# Patient Record
Sex: MALE | Race: BLACK OR AFRICAN AMERICAN
[De-identification: names, ages, dates, MRNs, and addresses within clinical notes are randomized per-mention and may not be internally consistent; named-entity substitution may affect disease eponyms.]

---

## 2017-05-21 ENCOUNTER — HOSPITAL ENCOUNTER (EMERGENCY)
Dept: HOSPITAL 10 - FTE | Age: 52
Discharge: HOME | End: 2017-05-21
Payer: MEDICARE

## 2017-05-21 VITALS
BODY MASS INDEX: 23.29 KG/M2 | HEIGHT: 72 IN | WEIGHT: 171.96 LBS | WEIGHT: 171.96 LBS | BODY MASS INDEX: 23.29 KG/M2 | HEIGHT: 72 IN

## 2017-05-21 DIAGNOSIS — H10.9: Primary | ICD-10-CM

## 2017-05-21 DIAGNOSIS — F17.210: ICD-10-CM

## 2017-05-21 DIAGNOSIS — Z79.4: ICD-10-CM

## 2017-05-21 DIAGNOSIS — E11.9: ICD-10-CM

## 2017-05-21 PROCEDURE — 99283 EMERGENCY DEPT VISIT LOW MDM: CPT

## 2017-05-21 NOTE — ERD
ER Documentation


Chief Complaint


Date/Time


DATE: 17 


TIME: 12:14


Chief Complaint


left eye redness





HPI


52-year-old male presented emergency department for left eye redness for 2 days.





Denies headache, loss of consciousness, eye trauma, head trauma, dizziness, 

blurry vision, changes in vision, photophobia, facial pain, ear pain, throat 

pain, difficulty swallowing, neck pain, shoulder pain, chest pain, cough, 

hemoptysis, abdominal pain, back pain, loss of appetite, nausea, vomiting, 

hematochezia, diarrhea, constipation, urinary symptoms, bladder and bowel 

incontinences, extremity weakness, extremity tenderness, numbness or tingling 

sensation, difficulty walking, recent travel, recent exposure to illness, 

recent antibiotic use in the last 3 months, fever, chills. 





No known drug allergies.  


Past medical history of diabetes.  


Surgical history of right Achilles tendon repair.


Medication: Stated that he uses insulin for his diabetes.


Social history: Not working at this time.  Smokes 1 pack of cigarettes a day.  

Denies use of alcoholic beverages, use of illegal drugs.





ROS


All systems reviewed and are negative except as per history of present illness.





Medications


Home Meds


Active Scripts


Erythromycin* (Erythromycin* Ophthalmic) 1 Applic Oint, 1 APPLIC BOTH EYES QID 

for 7 Days, EA


   Prov:GORAN STOCKTON         17





PMhx/Soc


Medical and Surgical Hx:  pt denies Medical Hx, pt denies Surgical Hx


Hx Alcohol Use:  Yes


Hx Substance Use:  Yes


Hx Tobacco Use:  Yes


Smoking Status:  Current every day smoker





Physical Exam


Vitals





Physical Exam


CONSTITUTIONAL: Well-appearing; well-nourished; in no apparent distress.  


HEAD: Normocephalic; atraumatic.  


EYES: 


Left eye: Conjunctival redness.  Clear, sclera non-icteric, EOM intact. PERRLA.


Right eye: Conjunctiva redness.  Clear, sclera non-icteric, EOM intact. PERRLA.


Ears: Hearing intact. EACs clear, TMs non-bulging, non-inflamed, translucent & 

mobile, ossicles normal appearance, No obstructions, no erythema, no discharges


Nose: No obstructions. No polyps. No external lesions. Mucosa non-inflamed. No 

external lesions, septum and turbinates normal. No rhinorrhea. No discharges. 

Frontal sinus is non-tender to palpation. Maxillary sinus is non-tender to 

palpation. 


MOUTH: Moist mucous membranes, no lesion, no obstructions, no vesicles, no 

thrush, patent airway


Throat: Uvula in midline. Right tonsil is +1 with no erythema, no exudate. Left 

tonsil is +1 with no erythema, no exudate. Tolerating secretions well. Good gag 

reflex. Patent airway.


Neck: Supple, without lesions, bruits, or adenopathy. No mass. Thyroid non-

enlarged and non-tender to palpation.


CHEST: Symmetrical chest. Respirations even and not labored. No retractions 

noted.


CARDIOVASCULAR: Normal S1, S2. RRR. No murmurs, gallops.


RESPIRATORY: Normal chest excursion with respiration; breath sounds clear and 

equal bilaterally; no wheezes, rhonchi, or rales.  Breathing even and 

unlabored. Speaking in clear, full, and complete sentences w/ ease. 


ABDOMEN: Normal bowel sounds normal. Soft, round, non-distended, non-guarding, 

no tenderness, no rebound, no organomegaly, no masses, no pulsating abdominal 

mass. No hernia. No peritoneal signs.


: No CVA tenderness. 


BACK: Symmetrical shoulder. Spine is midline without deformity, tenderness. No 

evidence of trauma or deformity.


PELVIS: Stable pelvis. No evidence of trauma or deformity.  


MUSCULOSKELETAL: Normal gait and station. No misalignment, asymmetry, 

crepitation, defects, tenderness, masses, effusions, decreased range of motion, 

instability, atrophy or abnormal strength or tone in the head, neck, spine, ribs

, pelvis or extremities. No calf tenderness. 


NEUROVASCULAR: Distal pulses are present. Pedal pulse are present, equal, and 

normal. Capillary refills are < 2 seconds.


NEUROLOGIC: Alert and oriented x4. Speaks full and clear sentences. Cranial 

Nerves II-XII normal. Sensation to pain, touch, and proprioception normal. 

Grossly unremarkable. No neurologic deficits. Romberg test is negative.


PSYCHOLOGICAL: The patients mood and manner are appropriate. No hallucinations

, delusions. Not SI. Not HI. Has the capacity to decide for self


SKIN: Normal for age and ethnicity; warm; dry; good turgor; no apparent lesions 

or exudates. No rashes, hives, discoloration. Intact.


Results 24 hrs





 Current Medications








 Medications


  (Trade)  Dose


 Ordered  Sig/Daisy


 Route


 PRN Reason  Start Time


 Stop Time Status Last Admin


Dose Admin


 


 Fluorescein Sodium


  (Fluor-I-Strip)  1 strip  ONCE  ONCE


 LEFT EYE


   17 12:30


 17 12:31 DC  


 


 


 Proparacaine HCl


  (Alcaine 0.5%)  1 drop  ONCE  ONCE


 LEFT EYE


   17 12:30


 17 12:31 DC  


 


 


 Pantoprazole


  (Protonix Iv)  40 mg  ONCE  ONCE


 IV


   17 13:00


 17 13:01 Cancel  


 











Procedures/MDM


Examination: Please see physical examination.





Disease process, medical treatment was explained to the patient and family 

member. They verbalized understanding and agreed with the diagnostic tests, 

medical treatment, and follow-up care.





Treatment: Proparacaine.  Fluorescein.


Bilateral eye exam: No signs of foreign bodies.  No signs of globe rupture.  

Extraocular movement is good.  No vesicular lesions.  No herpetic lesions.  No 

pain on eye movement.





Re-evaluation: Denies headache, dizziness, blurry vision, ear pain, throat pain

, neck pain, neck stiffness, shoulder pain, chest pain, abdominal pain, back 

pain, nausea.  No episode of emesis in the emergency department.  No pain on 

eye movement.  Extraocular movement of the eyes is within normal limits.  Good 

and full range of motion of neck and spine.  No neurological deficits.  No 

neurovascular deficits.





Consultation: None.





Differential diagnosis: Subconjunctival hemorrhage versus conjunctivitis





Medical decision makin-year-old male presented emergency department for 

left eye redness for 2 days. Denies headache, loss of consciousness, eye trauma

, head trauma, dizziness, blurry vision, changes in vision, photophobia, facial 

pain, ear pain, throat pain, difficulty swallowing, neck pain, shoulder pain, 

chest pain, cough, hemoptysis, abdominal pain, back pain, loss of appetite, 

nausea, vomiting, hematochezia, diarrhea, constipation, urinary symptoms, 

bladder and bowel incontinences, extremity weakness, extremity tenderness, 

numbness or tingling sensation, difficulty walking, recent travel, recent 

exposure to illness, recent antibiotic use in the last 3 months, fever, chills.

  Patient's complaint, my physical findings, diagnostic test results, my 

reevaluation are consistent my final diagnosis of conjunctivitis.





Medications prescribed are the following: Erythromycin ophthalmic ointment.





Patient and family member are made aware of the side effects and adverse 

reactions of the medications prescribed. Instructed on when to seek emergent 

and medical attention in case allergic/anaphylactic reactions or severe side 

effects and or adverse reactions to medications. Patient and family member 

verbalized understanding. 





Patient instructed


Instructed to follow-up with his PCP in 24-48 hours.  PCP to refer patient to 

ophthalmologist if symptoms get worse.


Instructed to Call 911 for chest pain, shortness of breath. Advised to come 

back here in ED as soon as possible for severity of symptoms which includes but 

not limited to: any new symptoms; shortness of breath/difficulty of breathing; 

cardiovascular changes; severe gastrointestinal symptoms; signs and symptoms of 

bleeding and or infection; signs of compartment syndrome/neurovascular changes; 

neurological changes/deficits. 


Patient and family member verbalized understanding. 





Upon discharge, patient is alert and oriented x 4, speaks full and clear 

sentences, denies pain, has no neurological deficits, has no neurovascular 

deficits, difficulty of breathing. Breathing even and unlabored. Lung sounds 

are clear to auscultation. Not in distress. Appears comfortable.  Ambulatory 

with steady gait. Appears satisfied with care provided here in ED.





Departure


Diagnosis:  


 Primary Impression:  


 Conjunctivitis


Condition:  Stable





Additional Instructions:  


atient instructed


Instructed to follow-up with his PCP in 24-48 hours.  PCP to refer patient to 

ophthalmologist if symptoms get worse.


Instructed to Call 911 for chest pain, shortness of breath. Advised to come 

back here in ED as soon as possible for severity of symptoms which includes but 

not limited to: any new symptoms; shortness of breath/difficulty of breathing; 

cardiovascular changes; severe gastrointestinal symptoms; signs and symptoms of 

bleeding and or infection; signs of compartment syndrome/neurovascular changes; 

neurological changes/deficits. 


Patient and family member verbalized understanding.











GORAN STOCKTON May 21, 2017 12:22


bleeding and or infection; signs of compartment syndrome/neurovascular changes; 

neurological changes/deficits. 


Patient and family member verbalized understanding.











GORAN STOCKTON May 21, 2017 12:22

## 2018-01-21 ENCOUNTER — HOSPITAL ENCOUNTER (EMERGENCY)
Dept: HOSPITAL 91 - E/R | Age: 53
Discharge: LEFT BEFORE DISCHARGE | End: 2018-01-21
Payer: MEDICARE

## 2018-01-21 ENCOUNTER — HOSPITAL ENCOUNTER (EMERGENCY)
Age: 53
Discharge: LEFT BEFORE DISCHARGE | End: 2018-01-21

## 2018-01-21 DIAGNOSIS — Z53.21: Primary | ICD-10-CM

## 2018-01-25 ENCOUNTER — HOSPITAL ENCOUNTER (EMERGENCY)
Dept: HOSPITAL 54 - ER | Age: 53
Discharge: HOME | End: 2018-01-25
Payer: MEDICARE

## 2018-01-25 VITALS — SYSTOLIC BLOOD PRESSURE: 136 MMHG | DIASTOLIC BLOOD PRESSURE: 77 MMHG

## 2018-01-25 VITALS — BODY MASS INDEX: 27.28 KG/M2 | HEIGHT: 68 IN | WEIGHT: 180 LBS

## 2018-01-25 DIAGNOSIS — Z79.4: ICD-10-CM

## 2018-01-25 DIAGNOSIS — F17.210: ICD-10-CM

## 2018-01-25 DIAGNOSIS — E11.9: ICD-10-CM

## 2018-01-25 DIAGNOSIS — R06.02: Primary | ICD-10-CM

## 2018-01-25 LAB
ALBUMIN SERPL BCP-MCNC: 3.5 G/DL (ref 3.4–5)
ALP SERPL-CCNC: 132 U/L (ref 46–116)
ALT SERPL W P-5'-P-CCNC: 37 U/L (ref 12–78)
APTT PPP: 25 SEC (ref 23–34)
AST SERPL W P-5'-P-CCNC: 42 U/L (ref 15–37)
BASOPHILS # BLD AUTO: 0.2 /CMM (ref 0–0.2)
BASOPHILS NFR BLD AUTO: 3.3 % (ref 0–2)
BILIRUB DIRECT SERPL-MCNC: 0.1 MG/DL (ref 0–0.2)
BILIRUB SERPL-MCNC: 0.5 MG/DL (ref 0.2–1)
BUN SERPL-MCNC: 19 MG/DL (ref 7–18)
CALCIUM SERPL-MCNC: 9 MG/DL (ref 8.5–10.1)
CHLORIDE SERPL-SCNC: 104 MMOL/L (ref 98–107)
CO2 SERPL-SCNC: 29 MMOL/L (ref 21–32)
CREAT SERPL-MCNC: 0.9 MG/DL (ref 0.6–1.3)
EOSINOPHIL # BLD AUTO: 0.6 /CMM (ref 0–0.7)
EOSINOPHIL NFR BLD AUTO: 7.7 % (ref 0–6)
GLUCOSE SERPL-MCNC: 175 MG/DL (ref 74–106)
HCT VFR BLD AUTO: 40 % (ref 39–51)
HGB BLD-MCNC: 13.6 G/DL (ref 13.5–17.5)
INR PPP: 0.94 (ref 0.85–1.15)
LYMPHOCYTES NFR BLD AUTO: 1.6 /CMM (ref 0.8–4.8)
LYMPHOCYTES NFR BLD AUTO: 21.3 % (ref 20–44)
MCH RBC QN AUTO: 29 PG (ref 26–33)
MCHC RBC AUTO-ENTMCNC: 34 G/DL (ref 31–36)
MCV RBC AUTO: 84 FL (ref 80–96)
MONOCYTES NFR BLD AUTO: 0.6 /CMM (ref 0.1–1.3)
MONOCYTES NFR BLD AUTO: 8.6 % (ref 2–12)
NEUTROPHILS # BLD AUTO: 4.5 /CMM (ref 1.8–8.9)
NEUTROPHILS NFR BLD AUTO: 59.1 % (ref 43–81)
NT-PROBNP SERPL-MCNC: 70 PG/ML (ref 0–125)
PLATELET # BLD AUTO: 166 /CMM (ref 150–450)
POTASSIUM SERPL-SCNC: 3.7 MMOL/L (ref 3.5–5.1)
PROT SERPL-MCNC: 7.3 G/DL (ref 6.4–8.2)
RBC # BLD AUTO: 4.8 MIL/UL (ref 4.5–6)
RDW COEFFICIENT OF VARIATION: 12.5 (ref 11.5–15)
SODIUM SERPL-SCNC: 138 MMOL/L (ref 136–145)
TROPONIN I SERPL-MCNC: < 0.017 NG/ML (ref 0–0.06)
WBC NRBC COR # BLD AUTO: 7.5 K/UL (ref 4.3–11)

## 2018-01-25 PROCEDURE — Z7610: HCPCS

## 2018-01-25 PROCEDURE — A4606 OXYGEN PROBE USED W OXIMETER: HCPCS

## 2018-03-20 ENCOUNTER — HOSPITAL ENCOUNTER (INPATIENT)
Dept: HOSPITAL 54 - ER | Age: 53
LOS: 1 days | Discharge: HOME | DRG: 66 | End: 2018-03-21
Attending: INTERNAL MEDICINE | Admitting: INTERNAL MEDICINE
Payer: MEDICARE

## 2018-03-20 VITALS — DIASTOLIC BLOOD PRESSURE: 8 MMHG | SYSTOLIC BLOOD PRESSURE: 143 MMHG

## 2018-03-20 VITALS — SYSTOLIC BLOOD PRESSURE: 144 MMHG | DIASTOLIC BLOOD PRESSURE: 77 MMHG

## 2018-03-20 VITALS — BODY MASS INDEX: 26.63 KG/M2 | WEIGHT: 186 LBS | HEIGHT: 70 IN

## 2018-03-20 VITALS — SYSTOLIC BLOOD PRESSURE: 143 MMHG | DIASTOLIC BLOOD PRESSURE: 74 MMHG

## 2018-03-20 VITALS — DIASTOLIC BLOOD PRESSURE: 76 MMHG | SYSTOLIC BLOOD PRESSURE: 143 MMHG

## 2018-03-20 VITALS — DIASTOLIC BLOOD PRESSURE: 87 MMHG | SYSTOLIC BLOOD PRESSURE: 147 MMHG

## 2018-03-20 DIAGNOSIS — F17.210: ICD-10-CM

## 2018-03-20 DIAGNOSIS — I34.0: ICD-10-CM

## 2018-03-20 DIAGNOSIS — F99: ICD-10-CM

## 2018-03-20 DIAGNOSIS — I10: ICD-10-CM

## 2018-03-20 DIAGNOSIS — E11.9: ICD-10-CM

## 2018-03-20 DIAGNOSIS — I63.9: Primary | ICD-10-CM

## 2018-03-20 DIAGNOSIS — Z91.14: ICD-10-CM

## 2018-03-20 DIAGNOSIS — Z79.4: ICD-10-CM

## 2018-03-20 DIAGNOSIS — J45.909: ICD-10-CM

## 2018-03-20 DIAGNOSIS — E78.5: ICD-10-CM

## 2018-03-20 LAB
ALBUMIN SERPL BCP-MCNC: 3.3 G/DL (ref 3.4–5)
ALP SERPL-CCNC: 120 U/L (ref 46–116)
ALT SERPL W P-5'-P-CCNC: 27 U/L (ref 12–78)
APTT PPP: 28 SEC (ref 23–34)
AST SERPL W P-5'-P-CCNC: 27 U/L (ref 15–37)
BASOPHILS # BLD AUTO: 0.1 /CMM (ref 0–0.2)
BASOPHILS NFR BLD AUTO: 0.9 % (ref 0–2)
BILIRUB DIRECT SERPL-MCNC: 0.1 MG/DL (ref 0–0.2)
BILIRUB SERPL-MCNC: 0.5 MG/DL (ref 0.2–1)
BUN SERPL-MCNC: 18 MG/DL (ref 7–18)
CALCIUM SERPL-MCNC: 8.3 MG/DL (ref 8.5–10.1)
CHLORIDE SERPL-SCNC: 104 MMOL/L (ref 98–107)
CHOLEST SERPL-MCNC: 201 MG/DL (ref ?–200)
CO2 SERPL-SCNC: 25 MMOL/L (ref 21–32)
CREAT SERPL-MCNC: 0.9 MG/DL (ref 0.6–1.3)
EOSINOPHIL # BLD AUTO: 0.7 /CMM (ref 0–0.7)
EOSINOPHIL NFR BLD AUTO: 7.6 % (ref 0–6)
GLUCOSE SERPL-MCNC: 157 MG/DL (ref 74–106)
HCT VFR BLD AUTO: 36 % (ref 39–51)
HDLC SERPL-MCNC: 80 MG/DL (ref 40–60)
HGB BLD-MCNC: 12.2 G/DL (ref 13.5–17.5)
INR PPP: 0.95 (ref 0.87–1.13)
LDLC SERPL DIRECT ASSAY-MCNC: 101 MG/DL (ref 0–99)
LYMPHOCYTES NFR BLD AUTO: 1.4 /CMM (ref 0.8–4.8)
LYMPHOCYTES NFR BLD AUTO: 15.7 % (ref 20–44)
MCH RBC QN AUTO: 29 PG (ref 26–33)
MCHC RBC AUTO-ENTMCNC: 34 G/DL (ref 31–36)
MCV RBC AUTO: 85 FL (ref 80–96)
MONOCYTES NFR BLD AUTO: 0.8 /CMM (ref 0.1–1.3)
MONOCYTES NFR BLD AUTO: 9 % (ref 2–12)
NEUTROPHILS # BLD AUTO: 5.9 /CMM (ref 1.8–8.9)
NEUTROPHILS NFR BLD AUTO: 66.8 % (ref 43–81)
PLATELET # BLD AUTO: 176 /CMM (ref 150–450)
POTASSIUM SERPL-SCNC: 3.7 MMOL/L (ref 3.5–5.1)
PROT SERPL-MCNC: 6.7 G/DL (ref 6.4–8.2)
RBC # BLD AUTO: 4.25 MIL/UL (ref 4.5–6)
RDW COEFFICIENT OF VARIATION: 13 (ref 11.5–15)
SODIUM SERPL-SCNC: 139 MMOL/L (ref 136–145)
TRIGL SERPL-MCNC: 138 MG/DL (ref 30–150)
TROPONIN I SERPL-MCNC: < 0.017 NG/ML (ref 0–0.06)
WBC NRBC COR # BLD AUTO: 8.9 K/UL (ref 4.3–11)

## 2018-03-20 PROCEDURE — Z7610: HCPCS

## 2018-03-20 PROCEDURE — A4606 OXYGEN PROBE USED W OXIMETER: HCPCS

## 2018-03-20 RX ADMIN — METFORMIN HYDROCHLORIDE SCH MG: 850 TABLET, FILM COATED ORAL at 17:28

## 2018-03-20 NOTE — NUR
TELE RN ADMISSION NOTES

ADMITTED 51 YO MALE PT, ALERT, AWAKE, VERBALLY RESPONSIVE, ON ROOM AIR, RESPIRATIONS EVEN, 
UNLABORED, NO APPARENT DISTRESS NOTED. DENIES ANY PAIN OR DISCOMFORT AT THIS TIME, NO C/O OF 
NUMBNESS. IV SITE LT HAND INTACT, PATENT. BODY ASSESSMENT DONE, NO SKIN BREAKDOWN NOTED. 
CALL LIGHT WITHIN REACH, BED LOCKED IN LOWEST POSITION. KEPT CLEAN AND COMFORTABLE, ATTENDED 
ALL NEEDS. WILL CONTINUE TO MONITOR ACCORDINGLY.

## 2018-03-20 NOTE — NUR
TELE RN CLOSING NOTES

PT IN BED, RESTING COMFORTABLY, ON ROOM AIR, RESPIRATIONS EVEN, UNLABORED, NO APPARENT 
DISTRESS NOTED. IV SITE LT HAND INTACT, PATENT. DENIES ANY PAIN OR DISCOMFORT AT THIS TIME. 
NSR 78. CALL LIGHT WITHIN REACH, BED LOCKED IN LOWEST POSITION. ATTENDED ALL NEEDS. WILL 
CONTINUE TO MONITOR ACCORDINGLY.

## 2018-03-20 NOTE — NUR
MS RN OPENING NOTES

PT IN BED ALERT, AWAKE, VERBALLY RESPONSIVE, ON ROOM AIR RESPIRATIONS EVEN, UNLABORED, NO 
APPARENT DISTRESS NOTED.DENIES ANY PAIN OR DISCOMFORT AT THIS TIME. IV SITE LT HAND INTACT, 
PATENT. CALL LIGHT WITHIN REACH, ATTENDED ALL NEEDS. BED LOCKED IN LOWEST POSITION. WILL 
CONTINUE TO MONITOR ACCORDINGLY.

## 2018-03-20 NOTE — NUR
Patient is resting comfortably in bed with eyes closed. Easily aroused. VSS. NO 
S/S OF PT DISCOMFORT NOTED

## 2018-03-20 NOTE — NUR
BBRA WITH C/O "EPISODE OF NUMBNESS, WEAKNESS SINCE 2200 PTA. PT ALSO STATES "I 
CANT STOP SHAKING". UPON ARRIVAL PT DENIED ANY NUMBNESS, TINGLING, OR SLURRED 
SPEECH. PT IS AAOX4. SKIN WNL. PT ABLE TO MOVE ALL EXTREMITIES AND STATES EQUAL 
SENSATIONS BILATERALLY. RESP EVEN AND UNLABORED. NO S/S OF ACUTE DISTRESS 
NOTED. VSS. PT GOWNED AND PLACED ON MONITOR AND POX. PT SAFETY AND COMOFRT 
MEASURES IN PLACE. CALL LIGHT PLACED WITHIN REACH. AWAITING MD FOR EVAL

## 2018-03-21 VITALS — SYSTOLIC BLOOD PRESSURE: 139 MMHG | DIASTOLIC BLOOD PRESSURE: 77 MMHG

## 2018-03-21 VITALS — DIASTOLIC BLOOD PRESSURE: 77 MMHG | SYSTOLIC BLOOD PRESSURE: 139 MMHG

## 2018-03-21 LAB
BASOPHILS # BLD AUTO: 0 /CMM (ref 0–0.2)
BASOPHILS NFR BLD AUTO: 0.5 % (ref 0–2)
BUN SERPL-MCNC: 14 MG/DL (ref 7–18)
CALCIUM SERPL-MCNC: 8.4 MG/DL (ref 8.5–10.1)
CHLORIDE SERPL-SCNC: 103 MMOL/L (ref 98–107)
CHOLEST SERPL-MCNC: 214 MG/DL (ref ?–200)
CO2 SERPL-SCNC: 25 MMOL/L (ref 21–32)
CREAT SERPL-MCNC: 0.9 MG/DL (ref 0.6–1.3)
EOSINOPHIL # BLD AUTO: 0.7 /CMM (ref 0–0.7)
EOSINOPHIL NFR BLD AUTO: 7.1 % (ref 0–6)
GLUCOSE SERPL-MCNC: 129 MG/DL (ref 74–106)
HCT VFR BLD AUTO: 40 % (ref 39–51)
HDLC SERPL-MCNC: 78 MG/DL (ref 40–60)
HGB BLD-MCNC: 13.1 G/DL (ref 13.5–17.5)
LDLC SERPL DIRECT ASSAY-MCNC: 120 MG/DL (ref 0–99)
LYMPHOCYTES NFR BLD AUTO: 1.4 /CMM (ref 0.8–4.8)
LYMPHOCYTES NFR BLD AUTO: 14.9 % (ref 20–44)
MAGNESIUM SERPL-MCNC: 1.6 MG/DL (ref 1.8–2.4)
MCH RBC QN AUTO: 28 PG (ref 26–33)
MCHC RBC AUTO-ENTMCNC: 33 G/DL (ref 31–36)
MCV RBC AUTO: 86 FL (ref 80–96)
MONOCYTES NFR BLD AUTO: 0.8 /CMM (ref 0.1–1.3)
MONOCYTES NFR BLD AUTO: 8.3 % (ref 2–12)
NEUTROPHILS # BLD AUTO: 6.7 /CMM (ref 1.8–8.9)
NEUTROPHILS NFR BLD AUTO: 69.2 % (ref 43–81)
PHOSPHATE SERPL-MCNC: 3.2 MG/DL (ref 2.5–4.9)
PLATELET # BLD AUTO: 177 /CMM (ref 150–450)
POTASSIUM SERPL-SCNC: 4.3 MMOL/L (ref 3.5–5.1)
RBC # BLD AUTO: 4.63 MIL/UL (ref 4.5–6)
RDW COEFFICIENT OF VARIATION: 12.8 (ref 11.5–15)
SODIUM SERPL-SCNC: 137 MMOL/L (ref 136–145)
TRIGL SERPL-MCNC: 82 MG/DL (ref 30–150)
WBC NRBC COR # BLD AUTO: 9.6 K/UL (ref 4.3–11)

## 2018-03-21 RX ADMIN — METFORMIN HYDROCHLORIDE SCH MG: 850 TABLET, FILM COATED ORAL at 17:00

## 2018-03-21 RX ADMIN — MAGNESIUM SULFATE IN DEXTROSE SCH MLS/HR: 10 INJECTION, SOLUTION INTRAVENOUS at 10:56

## 2018-03-21 RX ADMIN — MAGNESIUM SULFATE IN DEXTROSE SCH MLS/HR: 10 INJECTION, SOLUTION INTRAVENOUS at 12:49

## 2018-03-21 RX ADMIN — METFORMIN HYDROCHLORIDE SCH MG: 850 TABLET, FILM COATED ORAL at 09:22

## 2018-03-21 NOTE — NUR
MS RN CLOSING NOTES

PT IN BED RESTING COMFORTABLY, ON ROOM AIR RESPIRATIONS EVEN, UNLABORED, NO APPARENT 
DISTRESS NOTED. DENIES ANY PAIN OR DISCOMFORT, NO NUMBNESS AT THIS TIME. IV SITE LT HAND 
INTACT, PATENT. CALL LIGHT WITHIN REACH. BED LOCKED IN LOWEST POSITION. WILL CONTINUE TO 
MONITOR ACCORDINGLY.

## 2018-03-21 NOTE — NUR
RN NOTES.

RECEIVED PATIENT IN BED , SLEEPING BUT EASILY [place], RESPIRATION EVEN AND UNLABORED, NO 
COMPLAINTS OF PAIN OR DISCOMFORT AT THIS TIME, IV LEFT HAND 20 GAUGE INTACT AND PATENT , NO 
REDNESS OR INFILTRATION NOTED, SAFETY MEASURES IN PLACE , CALL LIGHT KEPT WITHIN REACH. KEPT 
CLEAN, DRY AND COMFORTABLE, WILL CONTINUE TO MONITOR.

## 2018-03-21 NOTE — NUR
RN NOTES

PER MD PATIENT TO BE ON ASPIRIN AND LIPITOR NO ANTICOAGULANT AT THIS TIME.

PATIENT ON DVT PUMPS AND AMBULATORY.

VTE SCORE 1

-------------------------------------------------------------------------------

Addendum: 03/21/18 at 1146 by SHAMIKA TRIPATHI RN

-------------------------------------------------------------------------------

Amended: Links added.

## 2018-03-21 NOTE — NUR
consult was requested from NP Sloane Lopez in regards to homelessness. Pt 
is a 52 year old  male who was admitted to [facility] emergency department for 
left-sided face and hand numbness and with a significant motor deficit. Patient currently 
resides in his car near [facility] ([address] [place] [address]). Patient states 
that when he gets discharged the  at [facility] will be setting him up with 
Transitional Living. Patient has no emergency contact. Pt denies any drug or alcohol use. Pt 
reports that he stopped smoking cigarettes since he has been in the hospital. Prior to his 
admission he would smoke two packs a day. Pt. reports that he has been diagnosed with 
schizophrenia. Pt reports he sees a psychiatrist and receives medication at [facility]. Pt 
is unable to recall psychiatrist name or medications taken. Patient denies suicidal and 
homicidal ideation.  Pt denies visual hallucinations. Pt reports that he hears voices 
stating, You cant do anything. 



RICHARD provided pt. with homeless resources including food [place], shelters, and medical clinics. 
RICHARD provided pt. with Mental Health Resources including [facility] ([address] [place] [address]; [phone]), [facility] ([address] [place] [address]; 
[phone]), and  [facility] ([address] [place] 
[address]; [phone]). RICHARD provided with tobacco cessation helplines [employer] 
(3-990-LUNG-[place]), [place] [employer] [facility] (0-870-NO BUTTS), and [employer] 
(1-403.341.4493). RICHARD spoke to ALLIE Rodrigues in regards to pt.s disposition and discharge plan. Pt. 
completed Homeless Patient Waiver Form. Plan: Pt will transport himself with his car back to 
[facility] ([address] [place] [address]; [phone]).

## 2018-03-21 NOTE — NUR
RN DISCHARGE NOTES.

PATIENT AWAKE ALERT AND VERBALLY RESPONSIVE, RESPIRATIONS EVEN AND UNLABORED, NO COMPLAINTS 
OF PAIN OR DISCOMFORT AT THIS TIME, IV LEFT HAND AND ID BAND REMOVED WITH NO ASE NOTED. 
PATIENT WITH DISCHARGE ORDERS, ALL DISCHARGE INSTRUCTIONS REVIEWED WITH PATIENT, VERBAL 
UNDERSTANDING NOTED. ALL BELONGINGS ACCOUNTED FOR. PATIENT DISCHARGED IN STABLE CONDITION, 
PRESCRIPTIONS GIVEN TO PATIENT